# Patient Record
Sex: MALE | Race: WHITE | URBAN - NONMETROPOLITAN AREA
[De-identification: names, ages, dates, MRNs, and addresses within clinical notes are randomized per-mention and may not be internally consistent; named-entity substitution may affect disease eponyms.]

---

## 2020-05-30 ENCOUNTER — IMPORTED ENCOUNTER (OUTPATIENT)
Dept: URBAN - NONMETROPOLITAN AREA CLINIC 1 | Facility: CLINIC | Age: 50
End: 2020-05-30

## 2020-05-30 PROBLEM — H52.03: Noted: 2020-05-30

## 2020-05-30 PROBLEM — H25.813: Noted: 2020-05-30

## 2020-05-30 PROBLEM — H52.223: Noted: 2020-05-30

## 2020-05-30 PROCEDURE — 92004 COMPRE OPH EXAM NEW PT 1/>: CPT

## 2020-05-30 NOTE — PATIENT DISCUSSION
Cataracts OU Discussed w/ patient Pt to contact office w/ any vision changes or issues w/ glare Continue to monitor Hyperopia-Discussed diagnosis with patient. Astigmatism-Discussed diagnosis with patient. New RX given at todays visit RTC 1 year CEE; 's Notes: [address]

## 2022-04-09 ASSESSMENT — VISUAL ACUITY
OD_SC: 20/20
OS_SC: 20/20

## 2022-04-09 ASSESSMENT — TONOMETRY
OD_IOP_MMHG: 22
OS_IOP_MMHG: 22